# Patient Record
Sex: MALE | Race: WHITE | URBAN - METROPOLITAN AREA
[De-identification: names, ages, dates, MRNs, and addresses within clinical notes are randomized per-mention and may not be internally consistent; named-entity substitution may affect disease eponyms.]

---

## 2024-02-22 ENCOUNTER — HOSPITAL ENCOUNTER (OUTPATIENT)
Facility: HOSPITAL | Age: 59
Discharge: HOME | End: 2024-02-25

## 2024-02-22 VITALS
HEIGHT: 73 IN | WEIGHT: 276 LBS | SYSTOLIC BLOOD PRESSURE: 150 MMHG | DIASTOLIC BLOOD PRESSURE: 93 MMHG | RESPIRATION RATE: 18 BRPM | BODY MASS INDEX: 36.58 KG/M2 | HEART RATE: 81 BPM

## 2024-02-22 DIAGNOSIS — C61 PROSTATE CANCER (HCC): Primary | ICD-10-CM

## 2024-02-22 RX ORDER — SEMAGLUTIDE 0.68 MG/ML
INJECTION, SOLUTION SUBCUTANEOUS
COMMUNITY
Start: 2023-12-12

## 2024-02-22 RX ORDER — PIOGLITAZONEHYDROCHLORIDE 15 MG/1
TABLET ORAL
COMMUNITY
Start: 2023-12-26

## 2024-02-22 RX ORDER — ATORVASTATIN CALCIUM 40 MG/1
TABLET, FILM COATED ORAL
COMMUNITY
Start: 2024-01-12

## 2024-02-22 ASSESSMENT — PAIN SCALES - GENERAL: PAINLEVEL_OUTOF10: 0

## 2024-02-22 NOTE — PROGRESS NOTES
Cancer [place] at [facility]  [facility]    Radiation Oncology Consultation  Encounter Date: 02/22/24    Michael Azul  [id number]  1965     Diagnosis   C61: [id number], iPSA 3.48, GG2 (+5/13) favorable intermediate risk prostate cancer    AJCC Staging has been reviewed.  History of Present Illness   Mr. Azul is a 59 y.o. male seen in consultation at the request of Dr. Dao to assess the role of radiation for his prostate cancer.    his oncologic history is detailed below:  03/24/2023: PSA 4.01  05/01/2023: PSA 3.48  08/29/2023: MRI prostate showed a 33cc gland with PIRADS 4 lesion in the left lateral PZ from mid-gland to base without EPE, pelvic lymphadenopathy, or aggressive osseous lesions. Questionable left SV involvement  10/12/2023: Fusion biopsy showed a GS 3+4=7 disease in the targeted core. Standard TRUS biopsy showed 3+4=7 disease 3 cores, GS 3=3=6 in 1 core, total 5/13 positive cores with up to 80% core involvement.    Symptomatically, he has mild urinary symptoms, doesn't require any medications for it. From a performance status standpoint, he is able to complete his ADLs without issue. Mr. Azul denies a history of inflammatory bowel disease, scleroderma or other collagen vascular diseases, pacemaker/AICD, or history of prior irradiation. Last colonoscopy was 3-4 years ago, WNL, next at 10 years.    IPSS:  Symptom Score   0 = Not at all   Incomplete Emptying 1   1 = Less than 1 in 5   Frequency 0   2 = Less than half the time    Intermittency 0   3 = About half the time   Urgency 0   4 =  More than half the time   Weak Stream 1   5 =  Almost always   Straining 0         Nocturia 1      Total 3         Quality of Life 1 (pleased)      0-7 Mildly symptomatic  8-19 moderately symptomatic  20-35 severely symptomatic    VALERIE score:  Confidence 3   Penetration 4   Ability to Maintain 4   Satisfaction 5   Maintain to Completion 5   Total 18     Review of

## 2024-02-26 ENCOUNTER — CLINICAL DOCUMENTATION (OUTPATIENT)
Facility: HOSPITAL | Age: 59
End: 2024-02-26

## 2024-02-26 NOTE — PROGRESS NOTES
NCCN Distress Thermometer    Date Screening Completed: 2/22/24    Screening Declined:  [] Yes    Number that best describes how much distress you've experienced in the past week, including today?  0 [x] - No distress 1 []      2 []      3 []      4 []       5 []       6 []      7 []      8 []      9 []       10 [] - Extreme distress    PROBLEM LIST  Have you had concerns about any of the items below in the past week, including today?      Physical Concerns Practical Concerns   [] Pain [] Taking care of myself    [] Sleep [] Taking care of others    [] Fatigue [] Work   [] Tobacco use  [] School   [] Substance use  [] Housing   [] Memory or concentration [] Finances   [] Sexual health [] Insurance   [] Changes in eating  [] Transportation   [] Loss or change of physical abilities  []     [] Having enough food   Emotional Concerns [] Access to medicine   [] Worry or anxiety [] Treatment decisions   [] Sadness or depression    [] Loss of interest or enjoyment  Spiritual or Adventism Concerns   [] Grief or loss  [] Sense of meaning or purpose   [] Fear [] Changes in yajaira or beliefs   [] Loneliness  [] Death, dying, or afterlife   [] Anger [] Conflict between beliefs and cancer treatments    [] Changes in appearance [] Relationship with the sacred   [] Feelings of worthlessness or being a burden [] Ritual or dietary needs        Social Concerns     [] Relationship with spouse or partner     [] Relationship with children    [] Relationship with family members     [] Relationship with friends or coworkers     [] Communication with health care team     [] Ability to have children     [] Prejudice or discrimination        Other Concerns: none    Patient received resource information and education:  [] Yes  [x] No    Referral/Handouts:

## 2024-04-29 ENCOUNTER — TRANSCRIBE ORDERS (OUTPATIENT)
Facility: HOSPITAL | Age: 59
End: 2024-04-29

## 2024-04-29 DIAGNOSIS — C61 MALIGNANT NEOPLASM OF PROSTATE (HCC): Primary | ICD-10-CM

## 2024-06-04 ENCOUNTER — HOSPITAL ENCOUNTER (OUTPATIENT)
Facility: HOSPITAL | Age: 59
Discharge: HOME | End: 2024-06-07
Attending: STUDENT IN AN ORGANIZED HEALTH CARE EDUCATION/TRAINING PROGRAM
Payer: COMMERCIAL

## 2024-06-04 ENCOUNTER — HOSPITAL ENCOUNTER (OUTPATIENT)
Facility: HOSPITAL | Age: 59
Discharge: HOME | End: 2024-06-07
Attending: STUDENT IN AN ORGANIZED HEALTH CARE EDUCATION/TRAINING PROGRAM

## 2024-06-04 DIAGNOSIS — C61 MALIGNANT NEOPLASM OF PROSTATE (HCC): ICD-10-CM

## 2024-06-04 PROCEDURE — 76498 UNLISTED MR PROCEDURE: CPT

## 2024-06-24 ENCOUNTER — HOSPITAL ENCOUNTER (OUTPATIENT)
Facility: HOSPITAL | Age: 59
Discharge: HOME | End: 2024-06-27
Attending: STUDENT IN AN ORGANIZED HEALTH CARE EDUCATION/TRAINING PROGRAM

## 2024-06-24 LAB
RAD ONC ARIA COURSE FIRST TREATMENT DATE: NORMAL
RAD ONC ARIA COURSE ID: NORMAL
RAD ONC ARIA COURSE INTENT: NORMAL
RAD ONC ARIA COURSE LAST TREATMENT DATE: NORMAL
RAD ONC ARIA COURSE SESSION NUMBER: 1
RAD ONC ARIA COURSE TREATMENT ELAPSED DAYS: 0
RAD ONC ARIA PLAN FRACTIONS TREATED TO DATE: 1
RAD ONC ARIA PLAN ID: NORMAL
RAD ONC ARIA PLAN PRESCRIBED DOSE PER FRACTION: 8 GY
RAD ONC ARIA PLAN PRIMARY REFERENCE POINT: NORMAL
RAD ONC ARIA PLAN TOTAL FRACTIONS PRESCRIBED: 5
RAD ONC ARIA PLAN TOTAL PRESCRIBED DOSE: 4000 CGY
RAD ONC ARIA REFERENCE POINT DOSAGE GIVEN TO DATE: 8 GY
RAD ONC ARIA REFERENCE POINT DOSAGE GIVEN TO DATE: 8.2 GY
RAD ONC ARIA REFERENCE POINT ID: NORMAL
RAD ONC ARIA REFERENCE POINT ID: NORMAL
RAD ONC ARIA REFERENCE POINT SESSION DOSAGE GIVEN: 8 GY
RAD ONC ARIA REFERENCE POINT SESSION DOSAGE GIVEN: 8.2 GY

## 2024-06-26 ENCOUNTER — HOSPITAL ENCOUNTER (OUTPATIENT)
Facility: HOSPITAL | Age: 59
Discharge: HOME | End: 2024-06-29
Attending: STUDENT IN AN ORGANIZED HEALTH CARE EDUCATION/TRAINING PROGRAM

## 2024-06-26 LAB
RAD ONC ARIA COURSE FIRST TREATMENT DATE: NORMAL
RAD ONC ARIA COURSE ID: NORMAL
RAD ONC ARIA COURSE INTENT: NORMAL
RAD ONC ARIA COURSE LAST TREATMENT DATE: NORMAL
RAD ONC ARIA COURSE SESSION NUMBER: 2
RAD ONC ARIA COURSE TREATMENT ELAPSED DAYS: 2
RAD ONC ARIA PLAN FRACTIONS TREATED TO DATE: 2
RAD ONC ARIA PLAN ID: NORMAL
RAD ONC ARIA PLAN PRESCRIBED DOSE PER FRACTION: 8 GY
RAD ONC ARIA PLAN PRIMARY REFERENCE POINT: NORMAL
RAD ONC ARIA PLAN TOTAL FRACTIONS PRESCRIBED: 5
RAD ONC ARIA PLAN TOTAL PRESCRIBED DOSE: 4000 CGY
RAD ONC ARIA REFERENCE POINT DOSAGE GIVEN TO DATE: 16 GY
RAD ONC ARIA REFERENCE POINT DOSAGE GIVEN TO DATE: 16.39 GY
RAD ONC ARIA REFERENCE POINT ID: NORMAL
RAD ONC ARIA REFERENCE POINT ID: NORMAL
RAD ONC ARIA REFERENCE POINT SESSION DOSAGE GIVEN: 8 GY
RAD ONC ARIA REFERENCE POINT SESSION DOSAGE GIVEN: 8.2 GY

## 2024-06-28 ENCOUNTER — HOSPITAL ENCOUNTER (OUTPATIENT)
Facility: HOSPITAL | Age: 59
Discharge: HOME | End: 2024-07-01
Attending: STUDENT IN AN ORGANIZED HEALTH CARE EDUCATION/TRAINING PROGRAM

## 2024-06-28 LAB
RAD ONC ARIA COURSE FIRST TREATMENT DATE: NORMAL
RAD ONC ARIA COURSE ID: NORMAL
RAD ONC ARIA COURSE INTENT: NORMAL
RAD ONC ARIA COURSE LAST TREATMENT DATE: NORMAL
RAD ONC ARIA COURSE SESSION NUMBER: 3
RAD ONC ARIA COURSE TREATMENT ELAPSED DAYS: 4
RAD ONC ARIA PLAN FRACTIONS TREATED TO DATE: 3
RAD ONC ARIA PLAN ID: NORMAL
RAD ONC ARIA PLAN PRESCRIBED DOSE PER FRACTION: 8 GY
RAD ONC ARIA PLAN PRIMARY REFERENCE POINT: NORMAL
RAD ONC ARIA PLAN TOTAL FRACTIONS PRESCRIBED: 5
RAD ONC ARIA PLAN TOTAL PRESCRIBED DOSE: 4000 CGY
RAD ONC ARIA REFERENCE POINT DOSAGE GIVEN TO DATE: 24 GY
RAD ONC ARIA REFERENCE POINT DOSAGE GIVEN TO DATE: 24.59 GY
RAD ONC ARIA REFERENCE POINT ID: NORMAL
RAD ONC ARIA REFERENCE POINT ID: NORMAL
RAD ONC ARIA REFERENCE POINT SESSION DOSAGE GIVEN: 8 GY
RAD ONC ARIA REFERENCE POINT SESSION DOSAGE GIVEN: 8.2 GY

## 2024-07-01 ENCOUNTER — HOSPITAL ENCOUNTER (OUTPATIENT)
Facility: HOSPITAL | Age: 59
Discharge: HOME | End: 2024-07-04
Attending: STUDENT IN AN ORGANIZED HEALTH CARE EDUCATION/TRAINING PROGRAM

## 2024-07-01 DIAGNOSIS — C61 PROSTATE CANCER (HCC): Primary | ICD-10-CM

## 2024-07-01 LAB
RAD ONC ARIA COURSE FIRST TREATMENT DATE: NORMAL
RAD ONC ARIA COURSE ID: NORMAL
RAD ONC ARIA COURSE INTENT: NORMAL
RAD ONC ARIA COURSE LAST TREATMENT DATE: NORMAL
RAD ONC ARIA COURSE SESSION NUMBER: 4
RAD ONC ARIA COURSE TREATMENT ELAPSED DAYS: 7
RAD ONC ARIA PLAN FRACTIONS TREATED TO DATE: 4
RAD ONC ARIA PLAN ID: NORMAL
RAD ONC ARIA PLAN PRESCRIBED DOSE PER FRACTION: 8 GY
RAD ONC ARIA PLAN PRIMARY REFERENCE POINT: NORMAL
RAD ONC ARIA PLAN TOTAL FRACTIONS PRESCRIBED: 5
RAD ONC ARIA PLAN TOTAL PRESCRIBED DOSE: 4000 CGY
RAD ONC ARIA REFERENCE POINT DOSAGE GIVEN TO DATE: 32 GY
RAD ONC ARIA REFERENCE POINT DOSAGE GIVEN TO DATE: 32.79 GY
RAD ONC ARIA REFERENCE POINT ID: NORMAL
RAD ONC ARIA REFERENCE POINT ID: NORMAL
RAD ONC ARIA REFERENCE POINT SESSION DOSAGE GIVEN: 8 GY
RAD ONC ARIA REFERENCE POINT SESSION DOSAGE GIVEN: 8.2 GY

## 2024-07-01 RX ORDER — TAMSULOSIN HYDROCHLORIDE 0.4 MG/1
0.4 CAPSULE ORAL DAILY
Qty: 30 CAPSULE | Refills: 6 | Status: SHIPPED | OUTPATIENT
Start: 2024-07-01

## 2024-07-01 ASSESSMENT — PAIN SCALES - GENERAL: PAINLEVEL_OUTOF10: 0

## 2024-07-01 NOTE — PROGRESS NOTES
Cancer [place] at [facility]  [facility]    Radiation Oncology Weekly Progress Note  Encounter Date: 07/01/24    Michael Azul  [id number]  1965     Diagnosis   C61: [id number], iPSA 3.48, GG2 (+5/13) favorable intermediate risk prostate cancer     AJCC Staging has been reviewed.  Oncologic History   Mr. Azul is a 59 y.o. male initially seen to assess the overall management and role of radiation for his above diagnosis.    03/24/2023: PSA 4.01  05/01/2023: PSA 3.48  08/29/2023: MRI prostate showed a 33cc gland with PIRADS 4 lesion in the left lateral PZ from mid-gland to base without EPE, pelvic lymphadenopathy, or aggressive osseous lesions. Questionable left SV involvement  10/12/2023: Fusion biopsy showed a GS 3+4=7 disease in the targeted core. Standard TRUS biopsy showed 3+4=7 disease 3 cores, GS 3=3=6 in 1 core, total 5/13 positive cores with up to 80% core involvement.    He was recommended a course of SBRT directed at the prostate and proximal SV (40Gy in 5fx)    Interval History   Mr. Azul was seen today for his weekly on-treatment evaluation    07/01/2024: at fraction 4, noticing weaker stream, incomplete emptying, urinary frequency. Will provide tamsulosin. Reviewed treatment expectations, will have him return in 3 months with PSA.    Treatment Details   Treatment Site: Prostate, SV  Treatment Technique: IMRT/VMAT  Treatment Site Dose/Fx (cGy) #Fx Delivered Dose (cGy) Total Planned Dose (cGy) Start Date End Date   Prostate,  4/5 3200 4000 06/24/24 07/01/24     Concurrent Therapy: No concurrent systemic therapy  Response to treatment: N/A    Allergies and Medications   No Known Allergies    Current Outpatient Medications   Medication Instructions    atorvastatin (LIPITOR) 40 MG tablet TAKE 1 TABLET BY MOUTH EVERY DAY**FASTING LABS/OFFICE VISIT DUE**LAST FILL**    GLIPIZIDE PO Oral    metoprolol tartrate (LOPRESSOR) 25 MG tablet TAKE 1 TABLET BY MOUTH

## 2024-07-03 ENCOUNTER — HOSPITAL ENCOUNTER (OUTPATIENT)
Facility: HOSPITAL | Age: 59
Discharge: HOME | End: 2024-07-06
Attending: STUDENT IN AN ORGANIZED HEALTH CARE EDUCATION/TRAINING PROGRAM

## 2024-07-03 ENCOUNTER — CLINICAL DOCUMENTATION (OUTPATIENT)
Facility: HOSPITAL | Age: 59
End: 2024-07-03

## 2024-07-03 LAB
RAD ONC ARIA COURSE FIRST TREATMENT DATE: NORMAL
RAD ONC ARIA COURSE ID: NORMAL
RAD ONC ARIA COURSE INTENT: NORMAL
RAD ONC ARIA COURSE LAST TREATMENT DATE: NORMAL
RAD ONC ARIA COURSE SESSION NUMBER: 5
RAD ONC ARIA COURSE TREATMENT ELAPSED DAYS: 9
RAD ONC ARIA PLAN FRACTIONS TREATED TO DATE: 5
RAD ONC ARIA PLAN ID: NORMAL
RAD ONC ARIA PLAN PRESCRIBED DOSE PER FRACTION: 8 GY
RAD ONC ARIA PLAN PRIMARY REFERENCE POINT: NORMAL
RAD ONC ARIA PLAN TOTAL FRACTIONS PRESCRIBED: 5
RAD ONC ARIA PLAN TOTAL PRESCRIBED DOSE: 4000 CGY
RAD ONC ARIA REFERENCE POINT DOSAGE GIVEN TO DATE: 40 GY
RAD ONC ARIA REFERENCE POINT DOSAGE GIVEN TO DATE: 40.98 GY
RAD ONC ARIA REFERENCE POINT ID: NORMAL
RAD ONC ARIA REFERENCE POINT ID: NORMAL
RAD ONC ARIA REFERENCE POINT SESSION DOSAGE GIVEN: 8 GY
RAD ONC ARIA REFERENCE POINT SESSION DOSAGE GIVEN: 8.2 GY

## 2024-10-03 DIAGNOSIS — C61 PROSTATE CANCER (HCC): ICD-10-CM

## 2024-10-05 LAB — PSA SERPL DL<=0.01 NG/ML-MCNC: 2.06 NG/ML (ref 0–4)

## 2024-10-07 ENCOUNTER — HOSPITAL ENCOUNTER (OUTPATIENT)
Facility: HOSPITAL | Age: 59
Discharge: HOME | End: 2024-10-10
Attending: STUDENT IN AN ORGANIZED HEALTH CARE EDUCATION/TRAINING PROGRAM

## 2024-10-07 VITALS
TEMPERATURE: 98 F | HEIGHT: 73 IN | WEIGHT: 283 LBS | BODY MASS INDEX: 37.51 KG/M2 | HEART RATE: 77 BPM | RESPIRATION RATE: 18 BRPM

## 2024-10-07 DIAGNOSIS — C61 PROSTATE CANCER (HCC): Primary | ICD-10-CM

## 2024-10-07 ASSESSMENT — PAIN SCALES - GENERAL: PAINLEVEL_OUTOF10: 0

## 2024-10-07 NOTE — PROGRESS NOTES
Cancer [place] at [facility]  [facility]    Radiation Oncology Follow Up  Encounter Date: 10/07/24    Michael Azul  [id number]  1965     Diagnosis   C61: [id number], iPSA 3.48, GG2 (+5/13) favorable intermediate risk prostate cancer     AJCC Staging has been reviewed.  Oncologic History   03/24/2023: PSA 4.01  05/01/2023: PSA 3.48  08/29/2023: MRI prostate showed a 33cc gland with PIRADS 4 lesion in the left lateral PZ from mid-gland to base without EPE, pelvic lymphadenopathy, or aggressive osseous lesions. Questionable left SV involvement  10/12/2023: Fusion biopsy showed a GS 3+4=7 disease in the targeted core. Standard TRUS biopsy showed 3+4=7 disease 3 cores, GS 3=3=6 in 1 core, total 5/13 positive cores with up to 80% core involvement.  07/03/2024: He completed a course SBRT directed at the prostate and proximal SV (40Gy in 5fx).    Interval History   Mr. Azul arrives today for follow up about 3 months from end of treatment. Symptomatically doing well, denies significant urinary issues, states he is no longer taking tamsulosin. No GI issues. He is scheduled to see VU in 01/2025.    IPSS:  Symptom Score   0 = Not at all   Incomplete Emptying 0   1 = Less than 1 in 5   Frequency 2   2 = Less than half the time    Intermittency 0   3 = About half the time   Urgency 0   4 =  More than half the time   Weak Stream 1   5 =  Almost always   Straining 0         Nocturia 1      Total 4         Quality of Life 1 (pleased)      0-7 Mildly symptomatic  8-19 moderately symptomatic  20-35 severely symptomatic    VALERIE score:  Confidence 5   Penetration 5   Ability to Maintain 5   Satisfaction 5   Maintain to Completion 5   Total 25     Review of Systems:  A complete review of systems was obtained and negative except as described above.    Interval Imaging/Labs   10/03/2024: PSA 2.06    Above imaging/lab reports were reviewed.  Allergies and Medications   No Known

## 2024-10-07 NOTE — PROGRESS NOTES
Cancer [place] at [facility]  [facility]    Radiation Oncology End of Treatment Summary    Michael Azul  [id number]  1965     Diagnosis   C61: [id number], iPSA 3.48, GG2 (+5/13) favorable intermediate risk prostate cancer      AJCC Staging has been reviewed.  Oncologic History   Mr. Azul is a 59 y.o. male initially seen in consultation to assess the overall management and role of radiation for his above diagnosis.    03/24/2023: PSA 4.01  05/01/2023: PSA 3.48  08/29/2023: MRI prostate showed a 33cc gland with PIRADS 4 lesion in the left lateral PZ from mid-gland to base without EPE, pelvic lymphadenopathy, or aggressive osseous lesions. Questionable left SV involvement  10/12/2023: Fusion biopsy showed a GS 3+4=7 disease in the targeted core. Standard TRUS biopsy showed 3+4=7 disease 3 cores, GS 3=3=6 in 1 core, total 5/13 positive cores with up to 80% core involvement.     He was recommended a course of SBRT directed at the prostate and proximal SV (40Gy in 5fx)     Treatment Details:   Treatment Site: Prostate, SV  Treatment Technique: IMRT/VMAT  Treatment Site Dose/Fx (cGy) #Fx Delivered Dose (cGy) Start Date End Date Elapsed Days   Prostate,  5 4000 06/24/24 7/3/2024 10     Concurrent Therapy: No concurrent systemic therapy      Under-treatment Course Summary   He completed radiation without any unexpected side effects to treatment.    Follow Up Plan   - Follow up in 3 months with PSA    Thank you for the opportunity to participate in Mr. Azul' care.    Fritz Byrne MD  [facility]  [facility]  [address] [place] [address]  P: [phone]  [facility] [address], [place] [address]  P: [phone]  [facility]  [address] [place] [address], [address], [place] [address]  P: [phone]  
No significant past surgical history

## 2025-04-28 ENCOUNTER — TELEPHONE (OUTPATIENT)
Facility: HOSPITAL | Age: 60
End: 2025-04-28

## 2025-04-28 NOTE — TELEPHONE ENCOUNTER
This writer called to remind the patient to complete his blood work prior to his upcoming appointment with Dr. Byrne on 05/14/2025.  A message was left on the patient's voicemail.

## 2025-05-07 ENCOUNTER — HOSPITAL ENCOUNTER (OUTPATIENT)
Facility: HOSPITAL | Age: 60
Discharge: HOME | End: 2025-05-10

## 2025-05-07 DIAGNOSIS — C61 PROSTATE CANCER (HCC): ICD-10-CM

## 2025-05-09 LAB — PSA SERPL DL<=0.01 NG/ML-MCNC: 0.88 NG/ML (ref 0–4)

## 2025-05-14 ENCOUNTER — HOSPITAL ENCOUNTER (OUTPATIENT)
Facility: HOSPITAL | Age: 60
Discharge: HOME | End: 2025-05-17

## 2025-05-14 VITALS
SYSTOLIC BLOOD PRESSURE: 154 MMHG | HEART RATE: 88 BPM | WEIGHT: 270.4 LBS | OXYGEN SATURATION: 95 % | HEIGHT: 73 IN | DIASTOLIC BLOOD PRESSURE: 93 MMHG | BODY MASS INDEX: 35.84 KG/M2

## 2025-05-14 DIAGNOSIS — C61 PROSTATE CANCER (HCC): Primary | ICD-10-CM

## 2025-05-14 RX ORDER — GABAPENTIN 300 MG/1
300 CAPSULE ORAL
COMMUNITY

## 2025-05-14 RX ORDER — INSULIN GLARGINE 300 U/ML
INJECTION, SOLUTION SUBCUTANEOUS NIGHTLY
COMMUNITY
Start: 2024-04-08

## 2025-05-14 NOTE — PROGRESS NOTES
Cancer [place] at [facility]  [facility]    Radiation Oncology Follow Up  Encounter Date: 05/14/25    Michael Azul  [id number]  1965     Diagnosis   C61: [id number], iPSA 3.48, GG2 (+5/13) favorable intermediate risk prostate cancer     AJCC Staging has been reviewed.  Oncologic History   03/24/2023: PSA 4.01  05/01/2023: PSA 3.48  08/29/2023: MRI prostate showed a 33cc gland with PIRADS 4 lesion in the left lateral PZ from mid-gland to base without EPE, pelvic lymphadenopathy, or aggressive osseous lesions. Questionable left SV involvement  10/12/2023: Fusion biopsy showed a GS 3+4=7 disease in the targeted core. Standard TRUS biopsy showed 3+4=7 disease 3 cores, GS 3=3=6 in 1 core, total 5/13 positive cores with up to 80% core involvement.  07/03/2024: He completed a course SBRT directed at the prostate and proximal SV (40Gy in 5fx).    Interval History   Mr. Azul arrives today for follow up about 10 months from end of treatment. Symptomatically doing well, has no complaints today. He denies urinary issues,is no longer taking tamsulosin. No GI complaints. He is seeing Dr. Weber at the end of July.    IPSS:  Symptom Score   0 = Not at all   Incomplete Emptying 0   1 = Less than 1 in 5   Frequency 3   2 = Less than half the time    Intermittency 0   3 = About half the time   Urgency 0   4 =  More than half the time   Weak Stream 0   5 =  Almost always   Straining 0         Nocturia 1      Total 4         Quality of Life 0 (delighted)      0-7 Mildly symptomatic  8-19 moderately symptomatic  20-35 severely symptomatic    VALERIE score:  Confidence 5   Penetration 5   Ability to Maintain 4   Satisfaction 5   Maintain to Completion 5   Total 19     Review of Systems:  A complete review of systems was obtained and negative except as described above.    Interval Imaging/Labs   10/03/2024: PSA 2.06  05/07/2025: PSA 0.88    Above imaging/lab reports were reviewed.  Allergies